# Patient Record
(demographics unavailable — no encounter records)

---

## 2024-11-08 NOTE — PHYSICAL EXAM
[Consolable] : consolable [Conjunctival Injection] : conjunctival injection [Mucoid Discharge] : mucoid discharge [NL] : warm, clear [Tired appearing] : not tired appearing [Eyelid Swelling] : no eyelid swelling [Nasal Flaring] : no nasal flaring [Wheezing] : no wheezing [Tachypnea] : no tachypnea [Belly Breathing] : no belly breathing [Subcostal Retractions] : no subcostal retractions [FreeTextEntry1] : crying [FreeTextEntry5] : tears [FreeTextEntry4] : thick nasal discharge

## 2024-11-08 NOTE — DISCUSSION/SUMMARY
[FreeTextEntry1] : vitals stable FOllow up ADENOVIRAL BRONCHIOLITIS/ Rhinovirus infection- continue fevers and mild dehydration NEXT 48 hours - NEED to give water down formula-  or water down juice - MONITOR for 2 wet diapers in 24 hours- if not go to ER Review signs of resp [id number] flaring,abdominal breathing, retraction) - go to Er if noted SALINE via nebulizer- 3-4 x day - demo in office how to use  NASAL suctioning liberally/ tylenol motrin for fever/ answer mother  questions - review paperwork from [facility] ER return next week

## 2024-11-08 NOTE — HISTORY OF PRESENT ILLNESS
[de-identified] : bronchiolitis [FreeTextEntry6] : 10 months old patient follow up fro0m ER visit, Afebrile. seen on 11/5/24-  [facility] ER-  fever high 102/ and cold symptoms- DX ADENOVIRAL Bronchiolitis-  mother has been given tylenol and motrin for fever-  nasal suctioning,  decrease in appetite- difficult to drink because of nasal congestion   yesterday had over 2 wet diapers BUT this AM woke up dry diaper- INFANT only wants formula not interested in pedialyte - taking 3 oz formula but slow feeds will drink water  attends

## 2025-01-17 NOTE — DISCUSSION/SUMMARY
[Normal Growth] : growth [Normal Development] : development [None] : No known medical problems [No Elimination Concerns] : elimination [No Feeding Concerns] : feeding [No Skin Concerns] : skin [Normal Sleep Pattern] : sleep [No Medications] : ~He/She~ is not on any medications [Parent/Guardian] : parent/guardian [] : The components of the vaccine(s) to be administered today are listed in the plan of care. The disease(s) for which the vaccine(s) are intended to prevent and the risks have been discussed with the caretaker.  The risks are also included in the appropriate vaccination information statements which have been provided to the patient's caregiver.  The caregiver has given consent to vaccinate. [FreeTextEntry1] :  on illness-	VIRAL ILLNESS	- FEVER   fluid in right ear - 		 Supportive care- fluids/rest/Tylenol/Motrin as needed/ use saline drops and  suction/ vapor rub/ steam in bathroom and humidifier in bedroom/ can supplement feeding with Pedialyte/ monitor wet diapers/ RAPID COVID NEG RAPID FLU NEG  Review growth and development DELAY IN SPEECH AND FEEDING _REFER for both . Answer parents questions and address their concerns/ feeding speech therapist for delays  Sent for routine labs  VISION test - pass  RETURN in 3 days recheck ears  NEEDS vaccine catch up

## 2025-01-17 NOTE — PHYSICAL EXAM
[Alert] : alert [Normocephalic] : normocephalic [Closed Anterior [place]] : closed anterior fontanelle [Red Reflex] : red reflex bilateral [PERRL] : PERRL [Normally Placed Ears] : normally placed ears [Auricles Well Formed] : auricles well formed [Clear Tympanic membranes] : clear tympanic membranes [Light reflex present] : light reflex present [Bony landmarks visible] : bony landmarks visible [Nares Patent] : nares patent [Palate Intact] : palate intact [Uvula Midline] : uvula midline [Tooth Eruption] : tooth eruption [Supple, full passive range of motion] : supple, full passive range of motion [Symmetric Chest Rise] : symmetric chest rise [Clear to Auscultation Bilaterally] : clear to auscultation bilaterally [Regular Rate and Rhythm] : regular rate and rhythm [S1, S2 present] : S1, S2 present [+2 Femoral Pulses] : (+) 2 femoral pulses [Soft] : soft [Bowel Sounds] : normoactive bowel sounds [Central Urethral Opening] : central urethral opening [Testicles Descended] : testicles descended bilaterally [No Abnormal Lymph Nodes Palpated] : no abnormal lymph nodes palpated [Symmetric Abduction and Rotation of Hips] : symmetric abduction and rotation of hips [Straight] : straight [Cranial Nerves Grossly Intact] : cranial nerves grossly intact [Circumcised] : circumcised [Crying] : crying [Consolable] : consolable [Playful] : playful [Discharge] : discharge [Normal External Genitalia] : normal external genitalia [Acute Distress] : no acute distress [Palpable Masses] : no palpable masses [Murmurs] : no murmurs [Tender] : nontender [Distended] : nondistended [Hepatomegaly] : no hepatomegaly [Splenomegaly] : no splenomegaly [Allis Sign] : negative Allis sign [Rash or Lesions] : no rash/lesions [de-identified] : RIGHT TM + erythema   decreased CONE of light  NO retraction  [de-identified] : thick nasal cischarge  [de-identified] : teething  [de-identified] : no retractions

## 2025-01-17 NOTE — DEVELOPMENTAL MILESTONES
[Normal Development] : Normal Development [Looks for hidden objects] : looks for hidden objects [Imitates new gestures] : imitates new gestures [Follows a verbal command that] : follows a verbal command that includes a gesture [Takes first independent] : takes first independent steps [Stands without support] : stands without support [Drops object in a cup] : drops object in a cup [Picks up small object with 2 finger] : picks up small object with 2 finger pincer grasp [Picks up food and eats it] : picks up food and eats it [Says "Dad" or "Mom" with meaning] : does not say "Dad" or "Mom" with meaning [Uses one word other than Mom or] : does not use one word other than Mom or Dad or personal names [FreeTextEntry1] : jabber but not specific words /  in  uses word MEMME for himself

## 2025-01-17 NOTE — HISTORY OF PRESENT ILLNESS
[Mother] : mother [Cow's milk ___ oz/feed] : [unfilled] oz of Cow's milk per feed [Fruit] : fruit [Vegetables] : vegetables [Meat] : meat [Dairy] : dairy [Finger food] : finger food [Table food] : table food [Normal] : Normal [In crib] : In crib [Sippy cup use] : Sippy cup use [Brushing teeth] : Brushing teeth [Vitamin] : Primary Fluoride Source: Vitamin [No] : Not at  exposure [Water heater temperature set at <120 degrees F] : Water heater temperature set at <120 degrees F [Car seat in back seat] : Car seat in back seat [Smoke Detectors] : Smoke detectors [Carbon Monoxide Detectors] : Carbon monoxide detectors [Up to date] : Up to date [___ Feeding per 24 hrs] : a  total of [unfilled] feedings in 24 hours [NO] : No [Playtime] : Playtime  [At risk for exposure to TB] : Not at risk for exposure to Tuberculosis [de-identified] : not interest in pureed food EATS finger foods but decrease in quantity   has mention to mother he is behind on eating  /  eats cookies and snacks like crakers  [de-identified] : Spitting out - Beans,  eggs, bread-  at home will swallow rice PREFER to drink milk  [FreeTextEntry1] : on 1/13/25- started with nasal congestion and breathing rapidly- given benadryl  did not help next day same thing when  from -  loose stools  dry cough, fussy, waking at night  TODAY in office FEVER

## 2025-01-17 NOTE — HISTORY OF PRESENT ILLNESS
[Mother] : mother [Cow's milk ___ oz/feed] : [unfilled] oz of Cow's milk per feed [Fruit] : fruit [Vegetables] : vegetables [Meat] : meat [Dairy] : dairy [Finger food] : finger food [Table food] : table food [Normal] : Normal [In crib] : In crib [Sippy cup use] : Sippy cup use [Brushing teeth] : Brushing teeth [Vitamin] : Primary Fluoride Source: Vitamin [No] : Not at  exposure [Water heater temperature set at <120 degrees F] : Water heater temperature set at <120 degrees F [Car seat in back seat] : Car seat in back seat [Smoke Detectors] : Smoke detectors [Carbon Monoxide Detectors] : Carbon monoxide detectors [Up to date] : Up to date [___ Feeding per 24 hrs] : a  total of [unfilled] feedings in 24 hours [NO] : No [Playtime] : Playtime  [At risk for exposure to TB] : Not at risk for exposure to Tuberculosis [de-identified] : not interest in pureed food EATS finger foods but decrease in quantity   has mention to mother he is behind on eating  /  eats cookies and snacks like crakers  [de-identified] : Spitting out - Beans,  eggs, bread-  at home will swallow rice PREFER to drink milk  [FreeTextEntry1] : on 1/13/25- started with nasal congestion and breathing rapidly- given benadryl  did not help next day same thing when  from -  loose stools  dry cough, fussy, waking at night  TODAY in office FEVER

## 2025-01-17 NOTE — PHYSICAL EXAM
[Alert] : alert [Normocephalic] : normocephalic [Closed Anterior [place]] : closed anterior fontanelle [Red Reflex] : red reflex bilateral [PERRL] : PERRL [Normally Placed Ears] : normally placed ears [Auricles Well Formed] : auricles well formed [Clear Tympanic membranes] : clear tympanic membranes [Light reflex present] : light reflex present [Bony landmarks visible] : bony landmarks visible [Nares Patent] : nares patent [Palate Intact] : palate intact [Uvula Midline] : uvula midline [Tooth Eruption] : tooth eruption [Supple, full passive range of motion] : supple, full passive range of motion [Symmetric Chest Rise] : symmetric chest rise [Clear to Auscultation Bilaterally] : clear to auscultation bilaterally [Regular Rate and Rhythm] : regular rate and rhythm [S1, S2 present] : S1, S2 present [+2 Femoral Pulses] : (+) 2 femoral pulses [Soft] : soft [Bowel Sounds] : normoactive bowel sounds [Central Urethral Opening] : central urethral opening [Testicles Descended] : testicles descended bilaterally [No Abnormal Lymph Nodes Palpated] : no abnormal lymph nodes palpated [Symmetric Abduction and Rotation of Hips] : symmetric abduction and rotation of hips [Straight] : straight [Cranial Nerves Grossly Intact] : cranial nerves grossly intact [Circumcised] : circumcised [Crying] : crying [Consolable] : consolable [Playful] : playful [Discharge] : discharge [Normal External Genitalia] : normal external genitalia [Acute Distress] : no acute distress [Palpable Masses] : no palpable masses [Murmurs] : no murmurs [Tender] : nontender [Distended] : nondistended [Hepatomegaly] : no hepatomegaly [Splenomegaly] : no splenomegaly [Allis Sign] : negative Allis sign [Rash or Lesions] : no rash/lesions [de-identified] : RIGHT TM + erythema   decreased CONE of light  NO retraction  [de-identified] : thick nasal cischarge  [de-identified] : teething  [de-identified] : no retractions

## 2025-01-24 NOTE — DISCUSSION/SUMMARY
[FreeTextEntry1] :  on illness-	RIGHT OM/  nasal congestion -cough/ new onset vomit x2 events	 Abx given	AMOXIL  MONITOR vomiting- can be post-tussive in nature or start of AGE- if AGE will worsen next 48 hours- discuss treatment for AGE- fluids and hold off abx until vomiting has stopped 		 Supportive care- fluids/rest/Tylenol/motrin as needed Review hand washing, cover your cough with child and parent DISCUSSION ON ANTIBIOTICS SIDE EFFECTS, HOW TO ADMINISTER,etc-  CAN GIVE YOGURT OR PROBIOTIC TO HELP REPLENISH GOOD GUT BACTERIA Return 2-3 weeks

## 2025-01-24 NOTE — PHYSICAL EXAM
[Erythema] : erythema [Mucoid Discharge] : mucoid discharge [Inflamed Nasal Mucosa] : inflamed nasal mucosa [NL] : warm, clear [Tired appearing] : not tired appearing [Conjuctival Injection] : no conjunctival injection [Discharge] : no discharge [Eyelid Swelling] : no eyelid swelling [Bulging] : not bulging [Retracted] : not retracted [Wheezing] : no wheezing [Crackles] : no crackles [Tachypnea] : no tachypnea [Rhonchi] : no rhonchi [FreeTextEntry1] : crying when lay down  [FreeTextEntry7] : productive cough in office

## 2025-01-24 NOTE — HISTORY OF PRESENT ILLNESS
[de-identified] : fluid in ear  [FreeTextEntry6] : 12 m/o is being seen for fluid in ear follow up. Mom stated patient still has a cough and runny nose. Afebrile in office. suctioning nose  fever resolved earlier this week-  still very cranky,  overnight and this am vomitting up water, milk, Normal wet diaper  did not eat breakfast yet

## 2025-02-12 NOTE — PHYSICAL EXAM
[Mucoid Discharge] : mucoid discharge [Erythematous Oropharynx] : erythematous oropharynx [NL] : warm, clear [Clear] : right tympanic membrane not clear [Erythema] : erythema [Bulging] : bulging

## 2025-02-12 NOTE — DISCUSSION/SUMMARY
[FreeTextEntry1] : 1) Viral syndrome- Recommend supportive care including antipyretics, fluids, and nasal saline followed by nasal suction. INFLUENZA A positive at the [facility] ER on 2/9/25. Since then he has been more tired. He is not drinking as well. Decreased wet diapers - so far today he had 1 and yesterday he had 3 wet diapers. 2) Vomiting at times- not since Sunday No stomach pains. No problems breathing, Normal activity level 3) Pharyngitis- rapid strep and rapid covid done today. 4) Otitis media- Complete antibiotics as prescribed and return if lack of improvement in 1 to 2 days or inability to tolerate the antibiotics. If any worsening ear pain, drainage, swelling or persistent fever, rash or concerns return to be seen. Follow up for ear check in 2 weeks or sooner for lack of improvement or worsening. She looks well hydrated today.

## 2025-02-12 NOTE — HISTORY OF PRESENT ILLNESS
[___ Day(s)] : [unfilled] day(s) [Intermittent] : intermittent [Irritable] : irritable [Consolable] : consolable [Fever] : FEVER [Ibuprofen] : ibuprofen [OTC Medication] : OTC medication [Last dose: _____] : Last dose: [unfilled] [Nasal Congestion] : nasal congestion [Cough] : cough [Decreased Appetite] : decreased appetite [Decreased Urine Output] : decreased urine output [Max Temp: ____] : Max temperature: [unfilled] [Known Exposure to COVID-19] : no known exposure to COVID-19 [Hx of recent COVID-19 infection] : no history of recent COVID-19 infection [Change in sleep pattern] : no change in sleep pattern [Eye Redness] : no eye redness [Eye Discharge] : no eye discharge [Ear Tugging] : no ear tugging [Wheezing] : no wheezing [Vomiting] : no vomiting [Diarrhea] : no diarrhea [FreeTextEntry5] : 3 wet diapers yesterday

## 2025-02-17 NOTE — PHYSICAL EXAM
[Consolable] : consolable [Conjuctival Injection] : no conjunctival injection [Erythema] : erythema [Bulging] : not bulging [Retracted] : not retracted [Mucoid Discharge] : mucoid discharge [Nasal Flaring] : no nasal flaring [Wheezing] : no wheezing [Tachypnea] : no tachypnea [Rhonchi] : no rhonchi [NL] : warm, clear [FreeTextEntry1] : tears with crying  [de-identified] : mmm

## 2025-02-17 NOTE — HISTORY OF PRESENT ILLNESS
[de-identified] : stool red  [FreeTextEntry6] : 13 m/o presents with red stool x 1 days. Afebrile in office.  had hard stool 3 days ago given rectal suppository to soften- then YESTERDAY stool have turned red DX with INFLUENZA  in ER  on2/8 then seen for f/up and diagnosis with RIHGT OM- GIVEN CEFDINIR - cough and congestion improving  no fever

## 2025-02-17 NOTE — DISCUSSION/SUMMARY
[FreeTextEntry1] : GUAIC stool- negative Stools soft red discoloration Explain to mother that Cefdnir change change color of stool to reddish-brown like sample in office NOT blood reassurance- complete abx and return in 2 weeks

## 2025-02-28 NOTE — HISTORY OF PRESENT ILLNESS
[de-identified] : Finished cefdinir [FreeTextEntry6] : s/p cefdinir since 2/12/25. He had right otitis media that day He had darker stools with the cefdinir (red) but it is now resolved He has normal stools- no longer red colored. Normal color- yellow His not vomiting; normal urine; active

## 2025-02-28 NOTE — DISCUSSION/SUMMARY
[FreeTextEntry1] : 1) Otitis media, resoved   2)Constipation- trial of apple juice with water 2 ounces daily and also mix in pureed veggies with at least 2 meals. Oats with pureed fruits and syrup (small amount)- will help to increase the bowel movement

## 2025-02-28 NOTE — HISTORY OF PRESENT ILLNESS
[de-identified] : Finished cefdinir [FreeTextEntry6] : s/p cefdinir since 2/12/25. He had right otitis media that day He had darker stools with the cefdinir (red) but it is now resolved He has normal stools- no longer red colored. Normal color- yellow His not vomiting; normal urine; active

## 2025-02-28 NOTE — HISTORY OF PRESENT ILLNESS
[de-identified] : Finished cefdinir [FreeTextEntry6] : s/p cefdinir since 2/12/25. He had right otitis media that day He had darker stools with the cefdinir (red) but it is now resolved He has normal stools- no longer red colored. Normal color- yellow His not vomiting; normal urine; active

## 2025-02-28 NOTE — HISTORY OF PRESENT ILLNESS
[de-identified] : Finished cefdinir [FreeTextEntry6] : s/p cefdinir since 2/12/25. He had right otitis media that day He had darker stools with the cefdinir (red) but it is now resolved He has normal stools- no longer red colored. Normal color- yellow His not vomiting; normal urine; active

## 2025-03-31 NOTE — DISCUSSION/SUMMARY
[FreeTextEntry1] : RESOLVED AGE-  stop Zofran  baby tolerating heavy porridge in office without vomit-  did spit up with abdominal exam but then finished porridge bottle afterward return to  program tomorrow - in program eats better than for mother  answer mom questions

## 2025-03-31 NOTE — HISTORY OF PRESENT ILLNESS
[de-identified] : ER F/UP  FOR gastroenteritis at [facility] [FreeTextEntry6] : 15 months old -  started with fever then vomiting seen in ER on 3/27/25-  covid negative- given zofran-  since then vomit has improved and started with diarrhea   last fever over 36 hours last dose of zofran this am (given bid x 4days) Infant tolerating cornmeal drink- 2 oz daily and water- will not eat anything else for mother- Yesterday acting happy  LAST night fussy only sleep when on belly

## 2025-03-31 NOTE — PHYSICAL EXAM
[Soft] : soft [Distended] : distended [Normal Bowel Sounds] : normal bowel sounds [Christ: ____] : Christ [unfilled] [Normal External Genitalia] : normal external genitalia [Retractile Testicle] : retractile testicle [Patent] : patent [NL] : warm, clear [Tired appearing] : not tired appearing [Conjuctival Injection] : no conjunctival injection [Wheezing] : no wheezing [Rhonchi] : no rhonchi [Belly Breathing] : no belly breathing [Tenderness with Palpation] : no tenderness with palpation [Erythema surrounding anus] : no erythema surrounding anus [FreeTextEntry1] : drinking porridge in office  [de-identified] : mmm [FreeTextEntry9] : no mass felt  no hernia noted  [FreeTextEntry6] : wet diaper

## 2025-03-31 NOTE — PHYSICAL EXAM
[Soft] : soft [Distended] : distended [Normal Bowel Sounds] : normal bowel sounds [Christ: ____] : Christ [unfilled] [Normal External Genitalia] : normal external genitalia [Retractile Testicle] : retractile testicle [Patent] : patent [NL] : warm, clear [Tired appearing] : not tired appearing [Conjuctival Injection] : no conjunctival injection [Wheezing] : no wheezing [Rhonchi] : no rhonchi [Belly Breathing] : no belly breathing [Tenderness with Palpation] : no tenderness with palpation [Erythema surrounding anus] : no erythema surrounding anus [FreeTextEntry1] : drinking porridge in office  [de-identified] : mmm [FreeTextEntry9] : no mass felt  no hernia noted  [FreeTextEntry6] : wet diaper

## 2025-03-31 NOTE — HISTORY OF PRESENT ILLNESS
[de-identified] : ER F/UP  FOR gastroenteritis at [facility] [FreeTextEntry6] : 15 months old -  started with fever then vomiting seen in ER on 3/27/25-  covid negative- given zofran-  since then vomit has improved and started with diarrhea   last fever over 36 hours last dose of zofran this am (given bid x 4days) Infant tolerating cornmeal drink- 2 oz daily and water- will not eat anything else for mother- Yesterday acting happy  LAST night fussy only sleep when on belly

## 2025-04-11 NOTE — DISCUSSION/SUMMARY
[FreeTextEntry1] : Vaccines given today include MMR and VARICELLA [] : The components of the vaccine(s) to be administered today are listed in the plan of care. The disease(s) for which the vaccine(s) are intended to prevent and the risks have been discussed with the caretaker.  The risks are also included in the appropriate vaccination information statements which have been provided to the patient's caregiver.  The caregiver has given consent to vaccinate.

## 2025-05-01 NOTE — DISCUSSION/SUMMARY
[Normal Growth] : growth [Normal Development] : development [None] : No known medical problems [No Elimination Concerns] : elimination [No Feeding Concerns] : feeding [No Skin Concerns] : skin [Normal Sleep Pattern] : sleep [No Medications] : ~He/She~ is not on any medications [Parent/Guardian] : parent/guardian [FreeTextEntry3] : HIb given left thigh and Prevnar given right thigh [] : The components of the vaccine(s) to be administered today are listed in the plan of care. The disease(s) for which the vaccine(s) are intended to prevent and the risks have been discussed with the caretaker.  The risks are also included in the appropriate vaccination information statements which have been provided to the patient's caregiver.  The caregiver has given consent to vaccinate.

## 2025-05-01 NOTE — HISTORY OF PRESENT ILLNESS
[Mother] : mother [Cow's milk (Ounces per day ___)] : consumes [unfilled] oz of cow's milk per day [Fruit] : fruit [Vegetables] : vegetables [Cereal] : cereal [Eggs] : eggs [___ stools per day] : [unfilled]  stools per day [___ voids per day] : [unfilled] voids per day [Normal] : Normal [In crib] : In crib [Wakes up at night] : Wakes up at night [Sippy cup use] : Sippy cup use [Vitamin] : Primary Fluoride Source: Vitamin [No] : Not at  exposure [Water heater temperature set at <120 degrees F] : Water heater temperature set at <120 degrees F [Car seat in back seat] : Car seat in back seat [Carbon Monoxide Detectors] : Carbon monoxide detectors [Smoke Detectors] : Smoke detectors [Up to date] : Up to date [NO] : No [Exposure to electronic nicotine delivery system] : No exposure to electronic nicotine delivery system [FreeTextEntry7] : No recent surgeries, injuries or hospitalizations [de-identified] : Exposure to hand-foot and mouth this past week- no sores, no fevers

## 2025-05-01 NOTE — PHYSICAL EXAM
[Alert] : alert [No Acute Distress] : no acute distress [Normocephalic] : normocephalic [Anterior [place] Closed] : anterior fontanelle closed [Red Reflex Bilateral] : red reflex bilateral [PERRL] : PERRL [Normally Placed Ears] : normally placed ears [Auricles Well Formed] : auricles well formed [Clear Tympanic membranes with present light reflex and bony landmarks] : clear tympanic membranes with present light reflex and bony landmarks [No Discharge] : no discharge [Nares Patent] : nares patent [Palate Intact] : palate intact [Uvula Midline] : uvula midline [Tooth Eruption] : tooth eruption  [Supple, full passive range of motion] : supple, full passive range of motion [No Palpable Masses] : no palpable masses [Symmetric Chest Rise] : symmetric chest rise [Clear to Auscultation Bilaterally] : clear to auscultation bilaterally [Regular Rate and Rhythm] : regular rate and rhythm [S1, S2 present] : S1, S2 present [No Murmurs] : no murmurs [+2 Femoral Pulses] : +2 femoral pulses [Soft] : soft [NonTender] : non tender [Non Distended] : non distended [Normoactive Bowel Sounds] : normoactive bowel sounds [No Hepatomegaly] : no hepatomegaly [No Splenomegaly] : no splenomegaly [Central Urethral Opening] : central urethral opening [Testicles Descended Bilaterally] : testicles descended bilaterally [Patent] : patent [Normally Placed] : normally placed [No Abnormal Lymph Nodes Palpated] : no abnormal lymph nodes palpated [No Clavicular Crepitus] : no clavicular crepitus [Negative Silverman-Ortalani] : negative Silverman-Ortalani [Symmetric Buttocks Creases] : symmetric buttocks creases [No Spinal Dimple] : no spinal dimple [NoTuft of Hair] : no tuft of hair [Cranial Nerves Grossly Intact] : cranial nerves grossly intact [No Rash or Lesions] : no rash or lesions

## 2025-05-01 NOTE — DEVELOPMENTAL MILESTONES
[Normal Development] : Normal Development [Yes: _______] : yes, [unfilled] [Points to ask for something] : points to ask for something or to get help [Uses 3 words other than names] : uses 3 words other than names [Speaks in sounds that seem like] : speaks in sounds that seem like an unknown language [Follows directions that do not] : follows direction that do not include a gesture [Looks when parent says,] : looks when parent says, "Where is...?" [Squats to  objects] : squats to  objects [Crawls up a few steps] : crawls up a few steps [Begins to run] : begins to run [Drops object into and takes object] : drops object into and takes object out of container [Imitates scribbling] : does not imitate scribbling [Drinks from cup with little] : does not drink from cup with little spilling [Makes oralia with crayon] : does not makes oralia with crayon [FreeTextEntry1] : Bilingual he speaks [place] ([place])

## 2025-05-14 NOTE — END OF VISIT
FAXED   [Time Spent: ___ minutes] : I have spent [unfilled] minutes of time on the encounter which excludes teaching and separately reported services.

## 2025-05-23 NOTE — DISCUSSION/SUMMARY
[FreeTextEntry1] : DTAP given right thigh Hep B given left thigh- pt tolerated well. [] : The components of the vaccine(s) to be administered today are listed in the plan of care. The disease(s) for which the vaccine(s) are intended to prevent and the risks have been discussed with the caretaker.  The risks are also included in the appropriate vaccination information statements which have been provided to the patient's caregiver.  The caregiver has given consent to vaccinate.